# Patient Record
Sex: MALE | Race: WHITE
[De-identification: names, ages, dates, MRNs, and addresses within clinical notes are randomized per-mention and may not be internally consistent; named-entity substitution may affect disease eponyms.]

---

## 2021-12-16 ENCOUNTER — HOSPITAL ENCOUNTER (EMERGENCY)
Dept: HOSPITAL 54 - ER | Age: 61
Discharge: HOME | End: 2021-12-16
Payer: SELF-PAY

## 2021-12-16 VITALS — WEIGHT: 200 LBS | HEIGHT: 74 IN | BODY MASS INDEX: 25.67 KG/M2

## 2021-12-16 VITALS — DIASTOLIC BLOOD PRESSURE: 89 MMHG | SYSTOLIC BLOOD PRESSURE: 148 MMHG
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
## 2021-12-16 DIAGNOSIS — S62.306A: Primary | ICD-10-CM
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
## 2021-12-16 RX ADMIN — Medication ONE TAB: at 20:34

## 2021-12-16 NOTE — NUR
BIBS FOR C/O R HAND SWELLING X 1 WEEK.  STS WAS SPLINTED FOR POSSIBLE FRACTURE 
WEEK AGO. RATES RIGHT HAND PAIN 8/10. WILL CONTINUE TO MONITOR THE PATIENT.